# Patient Record
Sex: MALE
[De-identification: names, ages, dates, MRNs, and addresses within clinical notes are randomized per-mention and may not be internally consistent; named-entity substitution may affect disease eponyms.]

---

## 2017-06-29 PROBLEM — Z00.00 ENCOUNTER FOR PREVENTIVE HEALTH EXAMINATION: Status: ACTIVE | Noted: 2017-06-29

## 2017-07-06 ENCOUNTER — APPOINTMENT (OUTPATIENT)
Dept: CARDIOLOGY | Facility: CLINIC | Age: 42
End: 2017-07-06
Payer: COMMERCIAL

## 2017-07-06 PROCEDURE — 99244 OFF/OP CNSLTJ NEW/EST MOD 40: CPT

## 2017-07-14 ENCOUNTER — APPOINTMENT (OUTPATIENT)
Dept: CARDIOLOGY | Facility: CLINIC | Age: 42
End: 2017-07-14
Payer: COMMERCIAL

## 2017-07-14 PROCEDURE — 93306 TTE W/DOPPLER COMPLETE: CPT

## 2017-07-25 ENCOUNTER — APPOINTMENT (OUTPATIENT)
Dept: CARDIOLOGY | Facility: CLINIC | Age: 42
End: 2017-07-25
Payer: COMMERCIAL

## 2017-07-25 PROCEDURE — 93351 STRESS TTE COMPLETE: CPT

## 2017-08-01 ENCOUNTER — APPOINTMENT (OUTPATIENT)
Dept: CARDIOLOGY | Facility: CLINIC | Age: 42
End: 2017-08-01

## 2019-05-13 ENCOUNTER — APPOINTMENT (OUTPATIENT)
Dept: RADIOLOGY | Facility: CLINIC | Age: 44
End: 2019-05-13
Payer: COMMERCIAL

## 2019-05-13 PROCEDURE — 72100 X-RAY EXAM L-S SPINE 2/3 VWS: CPT

## 2019-05-19 ENCOUNTER — EMERGENCY (EMERGENCY)
Facility: HOSPITAL | Age: 44
LOS: 1 days | End: 2019-05-19
Admitting: EMERGENCY MEDICINE
Payer: COMMERCIAL

## 2019-05-19 PROCEDURE — 99282 EMERGENCY DEPT VISIT SF MDM: CPT

## 2019-10-04 ENCOUNTER — APPOINTMENT (OUTPATIENT)
Dept: RADIOLOGY | Facility: CLINIC | Age: 44
End: 2019-10-04
Payer: COMMERCIAL

## 2019-10-04 PROCEDURE — 72100 X-RAY EXAM L-S SPINE 2/3 VWS: CPT

## 2019-10-04 PROCEDURE — 72070 X-RAY EXAM THORAC SPINE 2VWS: CPT

## 2020-04-02 ENCOUNTER — EMERGENCY (EMERGENCY)
Facility: HOSPITAL | Age: 45
LOS: 1 days | End: 2020-04-02
Admitting: EMERGENCY MEDICINE
Payer: COMMERCIAL

## 2020-04-02 PROCEDURE — 71046 X-RAY EXAM CHEST 2 VIEWS: CPT | Mod: 26

## 2020-04-02 PROCEDURE — 99284 EMERGENCY DEPT VISIT MOD MDM: CPT

## 2020-04-02 PROCEDURE — 70450 CT HEAD/BRAIN W/O DYE: CPT | Mod: 26

## 2020-04-20 ENCOUNTER — APPOINTMENT (OUTPATIENT)
Dept: CARDIOLOGY | Facility: CLINIC | Age: 45
End: 2020-04-20
Payer: COMMERCIAL

## 2020-04-20 VITALS
OXYGEN SATURATION: 98 % | TEMPERATURE: 98.4 F | SYSTOLIC BLOOD PRESSURE: 176 MMHG | HEIGHT: 74 IN | HEART RATE: 67 BPM | DIASTOLIC BLOOD PRESSURE: 102 MMHG | BODY MASS INDEX: 39.78 KG/M2 | WEIGHT: 310 LBS

## 2020-04-20 DIAGNOSIS — Z82.49 FAMILY HISTORY OF ISCHEMIC HEART DISEASE AND OTHER DISEASES OF THE CIRCULATORY SYSTEM: ICD-10-CM

## 2020-04-20 DIAGNOSIS — Z78.9 OTHER SPECIFIED HEALTH STATUS: ICD-10-CM

## 2020-04-20 DIAGNOSIS — Z87.898 PERSONAL HISTORY OF OTHER SPECIFIED CONDITIONS: ICD-10-CM

## 2020-04-20 DIAGNOSIS — Z82.3 FAMILY HISTORY OF STROKE: ICD-10-CM

## 2020-04-20 DIAGNOSIS — F17.200 NICOTINE DEPENDENCE, UNSPECIFIED, UNCOMPLICATED: ICD-10-CM

## 2020-04-20 PROCEDURE — 99214 OFFICE O/P EST MOD 30 MIN: CPT

## 2020-04-20 RX ORDER — LABETALOL HYDROCHLORIDE 200 MG/1
200 TABLET, FILM COATED ORAL TWICE DAILY
Qty: 120 | Refills: 0 | Status: DISCONTINUED | COMMUNITY
End: 2020-04-20

## 2020-04-20 NOTE — PHYSICAL EXAM
[General Appearance - Well Developed] : well developed [Well Groomed] : well groomed [Normal Appearance] : normal appearance [General Appearance - In No Acute Distress] : no acute distress [General Appearance - Well Nourished] : well nourished [No Deformities] : no deformities [Normal Oral Mucosa] : normal oral mucosa [Eyelids - No Xanthelasma] : the eyelids demonstrated no xanthelasmas [Normal Conjunctiva] : the conjunctiva exhibited no abnormalities [No Oral Cyanosis] : no oral cyanosis [No Oral Pallor] : no oral pallor [Respiration, Rhythm And Depth] : normal respiratory rhythm and effort [Exaggerated Use Of Accessory Muscles For Inspiration] : no accessory muscle use [Heart Sounds] : normal S1 and S2 [Auscultation Breath Sounds / Voice Sounds] : lungs were clear to auscultation bilaterally [Heart Rate And Rhythm] : heart rate and rhythm were normal [Edema] : no peripheral edema present [Murmurs] : no murmurs present [Abdomen Soft] : soft [Gait - Sufficient For Exercise Testing] : the gait was sufficient for exercise testing [Abdomen Tenderness] : non-tender [Abnormal Walk] : normal gait [FreeTextEntry1] : Obese [Nail Clubbing] : no clubbing of the fingernails [Cyanosis, Localized] : no localized cyanosis [] : no rash [Skin Color & Pigmentation] : normal skin color and pigmentation [Skin Turgor] : normal skin turgor [Affect] : the affect was normal [Oriented To Time, Place, And Person] : oriented to person, place, and time [Mood] : the mood was normal [No Anxiety] : not feeling anxious [Memory Recent] : recent memory was not impaired

## 2020-04-20 NOTE — DISCUSSION/SUMMARY
[FreeTextEntry1] : Uncontrolled hypertension: Patient has been compliant to medications.  He is not compliant to lifestyle modifications.  It seems that metoprolol had worked better than labetalol when he was in the ER?.  Change labetalol to Coreg.  Continue lisinopril.  Add HCTZ. Nonpharmacologic ways of reducing blood pressure were discussed.  This includes cessation of alcohol. If the blood pressure remains uncontrolled, secondary hypertension work-up should be initiated.\par \par Follow-up echocardiogram in few days along with blood pressure check.  He has shortness of breath on exertion.  Evaluate for PASP and diastolic function and wall thickness.\par \par Smoking cessation was discussed.  Risks of continued smoking and benefits of cessation were discussed.  Available pharmacotherapy was discussed.\par \par Cholesteremia was noted on recent labs.  He does not remember the lipid profile.  Will request a copy of this.  Statin therapy may be indicated depending upon calculated 10-year cardiovascular risk.  If there is any Gray area, CT calcium score should be considered.\par \par Patient could not have stress test in 2017 due to hypertension.  Once her blood pressures under control, ETT on medication should be performed.  This would also give us information regarding blood pressure control as well as exercise capacity.\par \par Thank you for this referral and allowing me to participate in the care of this patient.  If can be of any further help or  if you have any questions, please do not hesitate to contact me\par \par \par Sincerely,\par \par Schuyler Adrian MD, FACC, REGINA\par \par

## 2020-04-20 NOTE — ASSESSMENT
[FreeTextEntry1] : Reviewed today:\par -Echocardiogram July 2017: Normal LV function and wall motion.Mild diastolic dysfunction.\par -Labs 4/2/2020: Normal BMP.  Negative cardiac troponin.\par -EKG sinus rhythm unremarkable.

## 2020-05-06 ENCOUNTER — APPOINTMENT (OUTPATIENT)
Dept: CARDIOLOGY | Facility: CLINIC | Age: 45
End: 2020-05-06

## 2020-05-06 ENCOUNTER — APPOINTMENT (OUTPATIENT)
Dept: CARDIOLOGY | Facility: CLINIC | Age: 45
End: 2020-05-06
Payer: COMMERCIAL

## 2020-05-06 VITALS
DIASTOLIC BLOOD PRESSURE: 100 MMHG | WEIGHT: 307 LBS | SYSTOLIC BLOOD PRESSURE: 160 MMHG | BODY MASS INDEX: 39.42 KG/M2 | HEART RATE: 82 BPM | OXYGEN SATURATION: 96 % | TEMPERATURE: 98.6 F

## 2020-05-06 PROCEDURE — 93306 TTE W/DOPPLER COMPLETE: CPT

## 2020-05-13 ENCOUNTER — APPOINTMENT (OUTPATIENT)
Dept: CARDIOLOGY | Facility: CLINIC | Age: 45
End: 2020-05-13
Payer: COMMERCIAL

## 2020-05-13 ENCOUNTER — APPOINTMENT (OUTPATIENT)
Dept: CARDIOLOGY | Facility: CLINIC | Age: 45
End: 2020-05-13

## 2020-05-13 VITALS
WEIGHT: 307 LBS | TEMPERATURE: 98.4 F | SYSTOLIC BLOOD PRESSURE: 148 MMHG | HEART RATE: 82 BPM | DIASTOLIC BLOOD PRESSURE: 90 MMHG | OXYGEN SATURATION: 97 % | BODY MASS INDEX: 39.4 KG/M2 | HEIGHT: 74 IN

## 2020-05-13 PROCEDURE — 99214 OFFICE O/P EST MOD 30 MIN: CPT

## 2020-05-13 NOTE — REASON FOR VISIT
[FreeTextEntry1] : Gato he is a 45-year-old male with history of chronic hypertension, chronic smoking, overweight and hypercholesterolemia.\par \par History of dyspnea on exertion for few years.  In 2017, he was evaluated by Dr. Victoria.  Echocardiogram had shown normal LV function and wall motion but stress test could not be done due to uncontrolled hypertension.  Patient did not follow-up for few years. \par \par He was seen in ER at [facility] [place] on 4/2/2020 for uncontrolled hypertension and periorbital pressure sensation.  He was given metoprolol in the ER with decrease in blood pressure and CAT scan of the head was negative.\par \par He admits to dyspnea on exertion for the past several weeks.  He has gained weight.  He continues to smoke.  He does not have any fever or cough.  He denies chest discomfort with exertion or dyspnea.\par \par Recently, he was told that he has hypercholesterolemia and that he should lose weight and reduce saturated fat intake.\par \par Started on Coreg 25mg BID, HCTZ 12.5mg QD and Lisinopril 10mg BID.  Presents today for BP reevaluation.  BP on my exam was 158/88mmHg.  Asymptomatic.  Echocardiogram doesn't show any significant cardiomyopathy, valvulopathy or aneurysm.

## 2020-05-13 NOTE — PHYSICAL EXAM
[General Appearance - Well Developed] : well developed [Normal Appearance] : normal appearance [Well Groomed] : well groomed [General Appearance - Well Nourished] : well nourished [General Appearance - In No Acute Distress] : no acute distress [Normal Conjunctiva] : the conjunctiva exhibited no abnormalities [No Deformities] : no deformities [Eyelids - No Xanthelasma] : the eyelids demonstrated no xanthelasmas [Normal Oral Mucosa] : normal oral mucosa [No Oral Pallor] : no oral pallor [No Oral Cyanosis] : no oral cyanosis [Respiration, Rhythm And Depth] : normal respiratory rhythm and effort [Exaggerated Use Of Accessory Muscles For Inspiration] : no accessory muscle use [Auscultation Breath Sounds / Voice Sounds] : lungs were clear to auscultation bilaterally [Heart Rate And Rhythm] : heart rate and rhythm were normal [Murmurs] : no murmurs present [Heart Sounds] : normal S1 and S2 [Abdomen Soft] : soft [Edema] : no peripheral edema present [Abdomen Tenderness] : non-tender [Abnormal Walk] : normal gait [Gait - Sufficient For Exercise Testing] : the gait was sufficient for exercise testing [Nail Clubbing] : no clubbing of the fingernails [Skin Color & Pigmentation] : normal skin color and pigmentation [Cyanosis, Localized] : no localized cyanosis [Skin Turgor] : normal skin turgor [Affect] : the affect was normal [Oriented To Time, Place, And Person] : oriented to person, place, and time [] : no rash [Mood] : the mood was normal [Memory Recent] : recent memory was not impaired [No Anxiety] : not feeling anxious [FreeTextEntry1] : Obese

## 2020-05-13 NOTE — DISCUSSION/SUMMARY
[FreeTextEntry1] : Uncontrolled hypertension: Patient has been compliant to medications.  He is not compliant to lifestyle modifications.  DC lisinopril and HCTZ.  Start Edarbichlor 40/25mg.  Check BMP in 1 week.  BP check thereafter.  Nonpharmacologic ways of reducing blood pressure were discussed.  This includes cessation of alcohol. If the blood pressure remains uncontrolled, secondary hypertension work-up should be initiated.\par \par Smoking cessation was discussed.  Risks of continued smoking and benefits of cessation were discussed.  Available pharmacotherapy was discussed.\par \par Cholesteremia was noted on recent labs.  He does not remember the lipid profile.  Will request a copy of this.  Statin therapy may be indicated depending upon calculated 10-year cardiovascular risk.  If there is any Gray area, CT calcium score should be considered.\par \par Patient could not have stress test in 2017 due to hypertension.  Once his blood pressures under control, ETT on medication should be performed.  This would also give us information regarding blood pressure control as well as exercise capacity.

## 2020-05-13 NOTE — ADDENDUM
[FreeTextEntry1] : Please note the patient was reviewed with the PA.\par I was physically present during the service of the patient\par I was directly involved in the management plan and recommendations of care provided to the patient. \par I personally reviewed the history and physical exam and plan as documented by the PA above.\par \par Clark Roque DO, FACC, RPVI\par Cardiologist\par 05/13/2020\par \par

## 2020-05-13 NOTE — ASSESSMENT
[FreeTextEntry1] : Reviewed previously:\par -Echocardiogram July 2017: Normal LV function and wall motion.Mild diastolic dysfunction.\par -Labs 4/2/2020: Normal BMP.  Negative cardiac troponin.\par -EKG sinus rhythm unremarkable.

## 2020-05-27 ENCOUNTER — APPOINTMENT (OUTPATIENT)
Dept: CARDIOLOGY | Facility: CLINIC | Age: 45
End: 2020-05-27

## 2020-07-27 ENCOUNTER — APPOINTMENT (OUTPATIENT)
Dept: CARDIOLOGY | Facility: CLINIC | Age: 45
End: 2020-07-27

## 2020-07-29 ENCOUNTER — APPOINTMENT (OUTPATIENT)
Dept: CARDIOLOGY | Facility: CLINIC | Age: 45
End: 2020-07-29
Payer: COMMERCIAL

## 2020-07-29 VITALS
HEIGHT: 74 IN | SYSTOLIC BLOOD PRESSURE: 158 MMHG | OXYGEN SATURATION: 99 % | DIASTOLIC BLOOD PRESSURE: 96 MMHG | HEART RATE: 67 BPM | BODY MASS INDEX: 37.86 KG/M2 | TEMPERATURE: 98 F | WEIGHT: 295 LBS

## 2020-07-29 PROCEDURE — 99214 OFFICE O/P EST MOD 30 MIN: CPT

## 2020-07-29 NOTE — DISCUSSION/SUMMARY
[FreeTextEntry1] : Uncontrolled hypertension: Patient has been compliant to medications.  He is not compliant to lifestyle modifications and understands untreated CAMACHO is likely playing a large role in this. Recommend increasing Lisinopril to 40mg daily. Continue Coreg 25mg BID and HCTZ 12.5mg daily. Will add Nifedipine ER 30mg daily. Nonpharmacologic ways of reducing blood pressure were discussed.  This includes cessation of alcohol and smoking, which patient understands. \par \par HLD: smoking and EtOH cessation advised. \par \par Patient could not have stress test in 2017 due to hypertension.  Once his blood pressures under control, ETT on medication should be performed.  This would also give us information regarding blood pressure control as well as exercise capacity.\par \par Patient states he is losing his insurance in about 1 week but he will get it back within the next 4 weeks. He states he is willing to pay out of pocket for office visit in 4 weeks if he doesn't have insurance. Discussed the importance of close follow up.

## 2020-07-29 NOTE — HISTORY OF PRESENT ILLNESS
[FreeTextEntry1] : 45-year-old male with history of chronic hypertension, chronic smoking, overweight and hypercholesterolemia.\par \par History of dyspnea on exertion for few years.  In 2017, he was evaluated by Dr. Victoria.  Echocardiogram had shown normal LV function and wall motion but stress test could not be done due to uncontrolled hypertension.  Patient did not follow-up for few years. \par \par He was seen in ER at [facility] [place] on 4/2/2020 for uncontrolled hypertension and periorbital pressure sensation.  He was given metoprolol in the ER with decrease in blood pressure and CAT scan of the head was negative.\par \par He admits to dyspnea on exertion for the past several weeks.  He has gained weight.  He continues to smoke.  He does not have any fever or cough.  He denies chest discomfort with exertion or dyspnea.\par \par Recently, he was told that he has hypercholesterolemia and that he should lose weight and reduce saturated fat intake.\par \par Started on Coreg 25mg BID, HCTZ 12.5mg QD and Lisinopril 10mg BID.  Presents today for BP reevaluation.  BP on my exam remains elevated.  Asymptomatic.  \par \par Patient understands that CAMACHO is an issue. Wasn't able to get tested yet because of COVID-19. He wakes up very hypertensive.

## 2020-07-29 NOTE — PHYSICAL EXAM
[General Appearance - Well Developed] : well developed [Normal Appearance] : normal appearance [Well Groomed] : well groomed [No Deformities] : no deformities [General Appearance - Well Nourished] : well nourished [General Appearance - In No Acute Distress] : no acute distress [Normal Conjunctiva] : the conjunctiva exhibited no abnormalities [Eyelids - No Xanthelasma] : the eyelids demonstrated no xanthelasmas [Normal Oral Mucosa] : normal oral mucosa [No Oral Cyanosis] : no oral cyanosis [No Oral Pallor] : no oral pallor [Respiration, Rhythm And Depth] : normal respiratory rhythm and effort [Exaggerated Use Of Accessory Muscles For Inspiration] : no accessory muscle use [Auscultation Breath Sounds / Voice Sounds] : lungs were clear to auscultation bilaterally [Heart Sounds] : normal S1 and S2 [Heart Rate And Rhythm] : heart rate and rhythm were normal [Edema] : no peripheral edema present [Murmurs] : no murmurs present [Gait - Sufficient For Exercise Testing] : the gait was sufficient for exercise testing [Abnormal Walk] : normal gait [Nail Clubbing] : no clubbing of the fingernails [Skin Turgor] : normal skin turgor [Skin Color & Pigmentation] : normal skin color and pigmentation [Cyanosis, Localized] : no localized cyanosis [Affect] : the affect was normal [] : no rash [Oriented To Time, Place, And Person] : oriented to person, place, and time [Mood] : the mood was normal [Memory Recent] : recent memory was not impaired [No Anxiety] : not feeling anxious [FreeTextEntry1] : No JVD.  No Carotid bruits

## 2020-09-01 ENCOUNTER — APPOINTMENT (OUTPATIENT)
Dept: CARDIOLOGY | Facility: CLINIC | Age: 45
End: 2020-09-01
Payer: SELF-PAY

## 2020-09-01 VITALS
SYSTOLIC BLOOD PRESSURE: 132 MMHG | BODY MASS INDEX: 37.22 KG/M2 | TEMPERATURE: 98.4 F | HEIGHT: 74 IN | DIASTOLIC BLOOD PRESSURE: 84 MMHG | HEART RATE: 68 BPM | WEIGHT: 290 LBS | OXYGEN SATURATION: 97 %

## 2020-09-01 PROCEDURE — 99213 OFFICE O/P EST LOW 20 MIN: CPT

## 2020-09-01 NOTE — DISCUSSION/SUMMARY
[FreeTextEntry1] : Hypertension: better controlled.  Patient has been compliant to medications.  He is not compliant to lifestyle modifications and understands untreated CAMACHO is likely playing a large role in this. Recommend continuing Lisinopril 40mg daily. Continue Coreg 25mg BID and HCTZ 12.5mg daily. Continue Nifedipine ER 30mg daily. Nonpharmacologic ways of reducing blood pressure were discussed.  This includes cessation of alcohol and smoking, which patient understands. \par \par HLD: smoking and EtOH cessation advised. \par \par Dyspnea: largely resolved. Recommend ETT to assess BP response to exercise while on medications. \par \par Follow up in 4 months.

## 2020-09-01 NOTE — PHYSICAL EXAM
[General Appearance - Well Developed] : well developed [Normal Appearance] : normal appearance [Well Groomed] : well groomed [General Appearance - Well Nourished] : well nourished [No Deformities] : no deformities [General Appearance - In No Acute Distress] : no acute distress [Normal Conjunctiva] : the conjunctiva exhibited no abnormalities [Eyelids - No Xanthelasma] : the eyelids demonstrated no xanthelasmas [Normal Oral Mucosa] : normal oral mucosa [No Oral Pallor] : no oral pallor [No Oral Cyanosis] : no oral cyanosis [Respiration, Rhythm And Depth] : normal respiratory rhythm and effort [Exaggerated Use Of Accessory Muscles For Inspiration] : no accessory muscle use [Auscultation Breath Sounds / Voice Sounds] : lungs were clear to auscultation bilaterally [Heart Rate And Rhythm] : heart rate and rhythm were normal [Heart Sounds] : normal S1 and S2 [Murmurs] : no murmurs present [Edema] : no peripheral edema present [Abnormal Walk] : normal gait [Gait - Sufficient For Exercise Testing] : the gait was sufficient for exercise testing [Nail Clubbing] : no clubbing of the fingernails [Cyanosis, Localized] : no localized cyanosis [Skin Color & Pigmentation] : normal skin color and pigmentation [Skin Turgor] : normal skin turgor [] : no rash [Oriented To Time, Place, And Person] : oriented to person, place, and time [Affect] : the affect was normal [Mood] : the mood was normal [Memory Recent] : recent memory was not impaired [No Anxiety] : not feeling anxious [FreeTextEntry1] : No JVD.  No Carotid bruits

## 2020-09-01 NOTE — HISTORY OF PRESENT ILLNESS
[FreeTextEntry1] : 45-year-old male with history of chronic hypertension, chronic smoking, overweight and hypercholesterolemia.\par \par History of dyspnea on exertion for few years.  In 2017, he was evaluated by Dr. Victoria.  Echocardiogram had shown normal LV function and wall motion but stress test could not be done due to uncontrolled hypertension.  Patient did not follow-up for few years. \par \par [place] [facility] for uncontrolled hypertension and periorbital pressure sensation.  He was given metoprolol in the ER with decrease in blood pressure and CAT scan of the head was negative.\par \par He admits to dyspnea on exertion for the past several weeks.  He has gained weight.  He continues to smoke.  He does not have any fever or cough.  He denies chest discomfort with exertion or dyspnea.\par \par Recently, he was told that he has hypercholesterolemia and that he should lose weight and reduce saturated fat intake.\par \par Started on Coreg 25mg BID, HCTZ 12.5mg QD and Lisinopril 10mg BID.  Presents today for BP reevaluation.  BP on my exam remains elevated.  Asymptomatic.  \par \par Patient understands that CAMACHO is an issue. Wasn't able to get tested yet because of COVID-19. He wakes up very hypertensive.

## 2021-01-27 ENCOUNTER — EMERGENCY (EMERGENCY)
Facility: HOSPITAL | Age: 46
LOS: 1 days | End: 2021-01-27
Admitting: EMERGENCY MEDICINE
Payer: COMMERCIAL

## 2021-01-27 ENCOUNTER — NON-APPOINTMENT (OUTPATIENT)
Age: 46
End: 2021-01-27

## 2021-01-27 PROCEDURE — 93010 ELECTROCARDIOGRAM REPORT: CPT

## 2021-01-27 PROCEDURE — 99285 EMERGENCY DEPT VISIT HI MDM: CPT

## 2021-01-27 PROCEDURE — 71045 X-RAY EXAM CHEST 1 VIEW: CPT | Mod: 26

## 2021-02-24 ENCOUNTER — APPOINTMENT (OUTPATIENT)
Dept: CARDIOLOGY | Facility: CLINIC | Age: 46
End: 2021-02-24

## 2021-09-28 ENCOUNTER — RX RENEWAL (OUTPATIENT)
Age: 46
End: 2021-09-28

## 2021-09-28 ENCOUNTER — APPOINTMENT (OUTPATIENT)
Dept: CARDIOLOGY | Facility: CLINIC | Age: 46
End: 2021-09-28
Payer: COMMERCIAL

## 2021-09-28 VITALS
HEIGHT: 74 IN | OXYGEN SATURATION: 95 % | SYSTOLIC BLOOD PRESSURE: 204 MMHG | RESPIRATION RATE: 16 BRPM | BODY MASS INDEX: 40.43 KG/M2 | WEIGHT: 315 LBS | DIASTOLIC BLOOD PRESSURE: 102 MMHG | HEART RATE: 88 BPM

## 2021-09-28 VITALS — SYSTOLIC BLOOD PRESSURE: 196 MMHG | DIASTOLIC BLOOD PRESSURE: 112 MMHG

## 2021-09-28 VITALS — TEMPERATURE: 97.7 F

## 2021-09-28 DIAGNOSIS — E66.9 OBESITY, UNSPECIFIED: ICD-10-CM

## 2021-09-28 PROCEDURE — 99214 OFFICE O/P EST MOD 30 MIN: CPT

## 2021-09-28 RX ORDER — NIFEDIPINE 30 MG/1
30 TABLET, EXTENDED RELEASE ORAL
Qty: 90 | Refills: 3 | Status: DISCONTINUED | COMMUNITY
Start: 2020-07-29 | End: 2021-09-28

## 2021-09-28 NOTE — HISTORY OF PRESENT ILLNESS
[FreeTextEntry1] : 46-year-old male with history of chronic hypertension, chronic smoking, overweight and hypercholesterolemia.\par \par History of dyspnea on exertion for few years.  In 2017, he was evaluated by Dr. Victoria.  Echocardiogram had shown normal LV function and wall motion but stress test could not be done due to uncontrolled hypertension.  Patient did not follow-up for few years. \par \par He was seen in ER at [facility] on 4/2/2020 for uncontrolled hypertension and periorbital pressure sensation.  He was given metoprolol in the ER with decrease in blood pressure and CAT scan of the head was negative.\par \par He has gained 35lbs since last seen.  He continues to smoke.\kiesha banda Presents for reevaluation and renewal of his medications.  He has not taken them for several days as he ran out.  Blood pressure on my evaluation was 170/98 mmHg.  Asymptomatic in the office today.\kiesha banda Was evaluated in the ED January 2021 for hypertension along with chest pressure.  There was no follow-up.  Patient admits to continued occasional chest pressure especially when blood pressure is elevated.  There is shortness of breath.  He is active at his job with no exertional symptoms.\par \par He has not gone evaluation for sleep apnea.

## 2021-09-28 NOTE — PHYSICAL EXAM
[General Appearance - Well Developed] : well developed [Normal Appearance] : normal appearance [Well Groomed] : well groomed [General Appearance - Well Nourished] : well nourished [No Deformities] : no deformities [General Appearance - In No Acute Distress] : no acute distress [Normal Conjunctiva] : the conjunctiva exhibited no abnormalities [Eyelids - No Xanthelasma] : the eyelids demonstrated no xanthelasmas [] : no respiratory distress [Respiration, Rhythm And Depth] : normal respiratory rhythm and effort [Exaggerated Use Of Accessory Muscles For Inspiration] : no accessory muscle use [Auscultation Breath Sounds / Voice Sounds] : lungs were clear to auscultation bilaterally [Heart Rate And Rhythm] : heart rate and rhythm were normal [Heart Sounds] : normal S1 and S2 [Murmurs] : no murmurs present [Edema] : no peripheral edema present [Abnormal Walk] : normal gait [Gait - Sufficient For Exercise Testing] : the gait was sufficient for exercise testing [Nail Clubbing] : no clubbing of the fingernails [Cyanosis, Localized] : no localized cyanosis [Skin Color & Pigmentation] : normal skin color and pigmentation [Skin Turgor] : normal skin turgor [Oriented To Time, Place, And Person] : oriented to person, place, and time [Affect] : the affect was normal [Mood] : the mood was normal [Memory Recent] : recent memory was not impaired [No Anxiety] : not feeling anxious [FreeTextEntry1] : Psoriatic patches on elbows.

## 2021-09-28 NOTE — DISCUSSION/SUMMARY
[FreeTextEntry1] : Hypertension: Uncontrolled off his medications.  Patient verbalizes understanding need to remain on medicines.  They have been renewed today.  Nonpharmacologic ways of reducing blood pressure were discussed.  This includes cessation of alcohol and smoking, which patient understands.  Chantix was prescribed.\par \par HLD: smoking and EtOH cessation advised.  Repeat labs will be needed in the near future.\par \par Dyspnea: Along with occasional chest pressure.  Associated more with times of hypertension.  Repeat echocardiogram.  History of enlarged aortic root.  Return for blood pressure check and review echo results.  If blood pressure well controlled, would recommend nuclear stress test on medications to evaluate chest discomfort.  Red flag symptoms that would warrant emergent evaluation discussed.  Pt verbalizes understanding.

## 2021-10-12 ENCOUNTER — APPOINTMENT (OUTPATIENT)
Dept: CARDIOLOGY | Facility: CLINIC | Age: 46
End: 2021-10-12
Payer: COMMERCIAL

## 2021-10-12 PROCEDURE — 93306 TTE W/DOPPLER COMPLETE: CPT

## 2021-10-14 ENCOUNTER — APPOINTMENT (OUTPATIENT)
Dept: CARDIOLOGY | Facility: CLINIC | Age: 46
End: 2021-10-14
Payer: COMMERCIAL

## 2021-10-14 VITALS
BODY MASS INDEX: 40.43 KG/M2 | WEIGHT: 315 LBS | SYSTOLIC BLOOD PRESSURE: 122 MMHG | HEART RATE: 71 BPM | HEIGHT: 74 IN | OXYGEN SATURATION: 94 % | DIASTOLIC BLOOD PRESSURE: 84 MMHG

## 2021-10-14 DIAGNOSIS — R06.83 SNORING: ICD-10-CM

## 2021-10-14 PROCEDURE — 93000 ELECTROCARDIOGRAM COMPLETE: CPT

## 2021-10-14 PROCEDURE — 99214 OFFICE O/P EST MOD 30 MIN: CPT | Mod: 25

## 2021-10-14 RX ORDER — VARENICLINE 0.5 MG/1
0.5 TABLET, FILM COATED ORAL
Qty: 655 | Refills: 0 | Status: DISCONTINUED | COMMUNITY
Start: 2021-09-28 | End: 2021-10-14

## 2021-10-14 NOTE — DISCUSSION/SUMMARY
[FreeTextEntry1] : Hypertension: Uncontrolled off his medications. Since resuming medications, BP appears well controlled.   Patient verbalizes understanding need to remain on medicines.  Nonpharmacologic ways of reducing blood pressure were discussed.  This includes cessation of alcohol and smoking, which patient understands.  \par \par HLD: smoking and EtOH cessation advised.  \par \par Dyspnea: Along with occasional chest pressure.  Associated more with times of hypertension. BP is now well controlled. Recommend pharmacologic nuclear stress testing on medications. \par \par Dilated Aortic Root: measured 3.9cm on echo from 10/12/2021. Yearly echo surveillance. Adequate BP control. \par \par Recommend home sleep study secondary to obesity, large neck circumference, uncontrolled HTN, and snoring.\par \par Follow up in 3 months.

## 2021-10-14 NOTE — HISTORY OF PRESENT ILLNESS
[FreeTextEntry1] : 46-year-old male with history of chronic hypertension, chronic smoking, overweight and hypercholesterolemia.\par \par History of dyspnea on exertion for few years.  In 2017, he was evaluated by Dr. Victoria.  Echocardiogram had shown normal LV function and wall motion but stress test could not be done due to uncontrolled hypertension.  Patient did not follow-up for few years. \par \par He was seen in ER at [facility] [place] [facility] on 4/2/2020 for uncontrolled hypertension and periorbital pressure sensation.  He was given metoprolol in the ER with decrease in blood pressure and CAT scan of the head was negative.\par \par He has gained 35lbs since last seen.  He continues to smoke.\kiesha banda Presents for reevaluation and renewal of his medications.  He has not taken them for several days as he ran out.  Blood pressure on my evaluation was 170/98 mmHg.  Asymptomatic in the office today.\kiesha banda Was evaluated in the ED January 2021 for hypertension along with chest pressure.  There was no follow-up.  Patient admits to continued occasional chest pressure especially when blood pressure is elevated.  There is shortness of breath.  He is active at his job with no exertional symptoms.\par \par He has not gone evaluation for sleep apnea.

## 2021-10-14 NOTE — CARDIOLOGY SUMMARY
[Normal] : normal [___] : [unfilled] [LVEF ___%] : LVEF [unfilled]% [de-identified] : 10/12/2021, LV EF 60%, mild MR moderate LVH, mild TR [de-identified] : 10/14/2021, NSR with Q waves inferiorly

## 2021-10-14 NOTE — REASON FOR VISIT
[Symptom and Test Evaluation] : symptom and test evaluation [Hypertension] : hypertension [Other: ____] : [unfilled] [FreeTextEntry3] : Brendon Thorpe

## 2021-10-17 ENCOUNTER — APPOINTMENT (OUTPATIENT)
Age: 46
End: 2021-10-17

## 2021-10-23 ENCOUNTER — APPOINTMENT (OUTPATIENT)
Age: 46
End: 2021-10-23

## 2021-10-31 ENCOUNTER — APPOINTMENT (OUTPATIENT)
Age: 46
End: 2021-10-31

## 2021-11-15 ENCOUNTER — APPOINTMENT (OUTPATIENT)
Dept: CARDIOLOGY | Facility: CLINIC | Age: 46
End: 2021-11-15
Payer: COMMERCIAL

## 2021-11-15 PROCEDURE — 78452 HT MUSCLE IMAGE SPECT MULT: CPT

## 2021-11-15 PROCEDURE — 93015 CV STRESS TEST SUPVJ I&R: CPT

## 2021-11-15 PROCEDURE — A9502: CPT

## 2021-11-17 ENCOUNTER — APPOINTMENT (OUTPATIENT)
Dept: CARDIOLOGY | Facility: CLINIC | Age: 46
End: 2021-11-17
Payer: COMMERCIAL

## 2021-11-17 VITALS
HEIGHT: 74 IN | HEART RATE: 72 BPM | WEIGHT: 315 LBS | DIASTOLIC BLOOD PRESSURE: 80 MMHG | OXYGEN SATURATION: 97 % | BODY MASS INDEX: 40.43 KG/M2 | SYSTOLIC BLOOD PRESSURE: 132 MMHG

## 2021-11-17 DIAGNOSIS — I71.9 AORTIC ANEURYSM OF UNSPECIFIED SITE, W/OUT RUPTURE: ICD-10-CM

## 2021-11-17 DIAGNOSIS — I10 ESSENTIAL (PRIMARY) HYPERTENSION: ICD-10-CM

## 2021-11-17 DIAGNOSIS — R94.39 ABNORMAL RESULT OF OTHER CARDIOVASCULAR FUNCTION STUDY: ICD-10-CM

## 2021-11-17 DIAGNOSIS — Z79.899 OTHER LONG TERM (CURRENT) DRUG THERAPY: ICD-10-CM

## 2021-11-17 DIAGNOSIS — E78.00 PURE HYPERCHOLESTEROLEMIA, UNSPECIFIED: ICD-10-CM

## 2021-11-17 PROCEDURE — 99215 OFFICE O/P EST HI 40 MIN: CPT

## 2021-11-17 NOTE — DISCUSSION/SUMMARY
[FreeTextEntry1] : 1. Hypertension: Uncontrolled off his medications. Since resuming medications, BP appears well controlled.   Patient verbalizes understanding need to remain on medicines.  Nonpharmacologic ways of reducing blood pressure were discussed.  This includes cessation of alcohol and smoking, which patient understands.  Patient should continue Coreg 25mg BID (high risk medication with no signs of toxicity), HCTZ 12.5mg daily, lisinopril 40mg daily, and Nifedipine ER 30mg daily. Goal BP less than 130/80.\par \par 2. HLD: smoking and EtOH cessation advised.  \par \par 3. Dyspnea: Along with occasional chest pressure.  Associated more with times of hypertension. BP is now well controlled. Recommended pharmacologic nuclear stress testing on medications. Testing resulted in possible ischemia. Discussed invasive angiography vs. CTA of the coronary arteries. Risks and benefits of each one. Patient opting for CTA of the coronary arteries. \par \par 4. Dilated Aortic Root: measured 3.9cm on echo from 10/12/2021. Yearly echo surveillance. Adequate BP control. \par \par 5. Recommend home sleep study secondary to obesity, large neck circumference, uncontrolled HTN, and snoring.\par \par Follow up in 3 months.

## 2021-11-17 NOTE — PHYSICAL EXAM
[Normal] : moves all extremities, no focal deficits, normal speech [de-identified] : No carotid bruits auscultated bilaterally

## 2021-11-17 NOTE — CARDIOLOGY SUMMARY
[de-identified] : 10/14/2021, NSR with Q waves inferiorly [de-identified] : 11/15/2021, Pharmacologic Nuclear Stress Testing: small area of reversibility in the basal anterior, anterolateral wall suggestive of ischemia. [de-identified] : 10/12/2021, LV EF 60%, mild MR moderate LVH, mild TR

## 2021-11-17 NOTE — HISTORY OF PRESENT ILLNESS
[FreeTextEntry1] : 46-year-old male with history of chronic hypertension, chronic smoking, overweight and hypercholesterolemia.\par \par History of dyspnea on exertion for few years.  In 2017, he was evaluated by Dr. Victoria.  Echocardiogram had shown normal LV function and wall motion but stress test could not be done due to uncontrolled hypertension.  Patient did not follow-up for few years. \par \par He was seen in ER at [facility] [place] on 4/2/2020 for uncontrolled hypertension and periorbital pressure sensation.  He was given metoprolol in the ER with decrease in blood pressure and CAT scan of the head was negative.\par \par He has gained 35lbs since last seen.  He continues to smoke.\kiesha banda Presents for reevaluation and renewal of his medications.  He has not taken them for several days as he ran out.  Blood pressure on my evaluation was 170/98 mmHg.  Asymptomatic in the office today.\kiesha banda Was evaluated in the ED January 2021 for hypertension along with chest pressure.  There was no follow-up.  Patient admits to continued occasional chest pressure especially when blood pressure is elevated.  There is shortness of breath.  He is active at his job with no exertional symptoms.\par \par He has not gone evaluation for sleep apnea.

## 2021-12-12 ENCOUNTER — APPOINTMENT (OUTPATIENT)
Age: 46
End: 2021-12-12

## 2021-12-13 ENCOUNTER — NON-APPOINTMENT (OUTPATIENT)
Age: 46
End: 2021-12-13

## 2021-12-19 ENCOUNTER — APPOINTMENT (OUTPATIENT)
Age: 46
End: 2021-12-19

## 2021-12-20 ENCOUNTER — RESULT REVIEW (OUTPATIENT)
Age: 46
End: 2021-12-20

## 2021-12-20 ENCOUNTER — OUTPATIENT (OUTPATIENT)
Dept: OUTPATIENT SERVICES | Facility: HOSPITAL | Age: 46
LOS: 1 days | End: 2021-12-20
Payer: COMMERCIAL

## 2021-12-20 ENCOUNTER — APPOINTMENT (OUTPATIENT)
Dept: CT IMAGING | Facility: CLINIC | Age: 46
End: 2021-12-20
Payer: COMMERCIAL

## 2021-12-20 DIAGNOSIS — R94.39 ABNORMAL RESULT OF OTHER CARDIOVASCULAR FUNCTION STUDY: ICD-10-CM

## 2021-12-20 PROCEDURE — 82565 ASSAY OF CREATININE: CPT

## 2021-12-20 PROCEDURE — 75574 CT ANGIO HRT W/3D IMAGE: CPT | Mod: 26

## 2021-12-20 PROCEDURE — 75574 CT ANGIO HRT W/3D IMAGE: CPT

## 2021-12-21 ENCOUNTER — NON-APPOINTMENT (OUTPATIENT)
Age: 46
End: 2021-12-21

## 2021-12-30 DIAGNOSIS — R06.02 SHORTNESS OF BREATH: ICD-10-CM

## 2021-12-30 DIAGNOSIS — Z87.891 PERSONAL HISTORY OF NICOTINE DEPENDENCE: ICD-10-CM

## 2021-12-30 RX ORDER — VARENICLINE 0.5 MG/1
0.5 TABLET, FILM COATED ORAL
Qty: 319 | Refills: 0 | Status: ACTIVE | COMMUNITY
Start: 2021-12-30 | End: 1900-01-01

## 2022-03-21 NOTE — REASON FOR VISIT
[FreeTextEntry1] : Floyd is a 9 year old male here for follow up evaluation of inattention. \par \par Floyd is currently in 2nd grade.  He is in an ICT class but on the general education side.  While he is doing well academically there are concerns from teachers that he requires redirection.  He has a tendency to talk during class. When it comes to homework, he is able to do most of it independently but does need mother to sit next tp him to keep him on point.  He is easily distracted and often stops to talk.  Mother notes he struggles the most with reading comprehension and math word problems.  \par \par [place] questionnaires were completed after last visit by parents and teacher. \par \par  Parents responses:\par  Inattention 6/9- (6/9).  \par  Hyperactivity 3/9 (6/9)\par  ODD: 0/8. (4/8)\par  Conduct disorder: 0/14 (3/14)\par  Anxiety/ Depression: 0/7 (3/7)  \par \par Teachers responses: \par  Inattention 9/9- (6/9).  \par  Hyperactivity 3/9 (6/9)\par  ODD/ Conduct: 0/10. (3/10)\par  Anxiety/ Depression: 0/7 (3/7 )  \par \par Performance questions:\par Parents: Areas of concern include  overall school performance\par Teachers: Areas of concern include mathematics and written expression. Following directions,disrupting class,  assignment completion and organizational skills.\par \par  \par \par \par Initial Visit: \par Early development:  Floyd was born full term via NVD. He was discharged home with mother. He hit all early developmental milestones appropriately except he did not speak until 4 years old.  Mother denies academic, behavioral or social concerns in early childhood. \par \par Educational assessment: \par Current Grade: 1st grade \par Current District: [employer] \par Floyd started  in a General education class. Mother recalls there was a concern for lack of focus and he had a tendency to get out of his seat often.  Academically there were no concerns from teacher. He transitioned to remote learning in March of 2020 due to [place]. Mother agrees there were attention issues and he required a lof of redirection.  He remains fully remote but mother notes there has been significant improvement in ability to complete assignments independently as well as the ability to sit in focus overall. He will be returning to in person next school year.    Despite remote learning mother notes he is on target academically. \par \par \par Home assessment: \par At home Floyd does requires redirection- even for daily routines.  He is able to complete multi- step commands- if he wants to.  He is able to sit and eat within a reasonable amount of time. He does have some difficulty transitioning but will eventually do it.  \par \par Social concerns: Has not socialize outside of family friends since Co-vid- but mother denies concerns last year. She does not that he  does not loose well\par \par Co- morbidities: No concern for anxiety, depression, OCD\par \par Sleep: 10pm- 7am \par \par Other health concerns: Denies staring, twitching, seizure or seizure-like activity. No serious head injury, meningoencephalitis.\par  No [FreeTextEntry1] : Gato he is a 45-year-old male with history of chronic hypertension, chronic smoking, overweight and hypercholesterolemia.\par \par History of dyspnea on exertion for few years.  In 2017, he was evaluated by Dr. Victoria.  Echocardiogram had shown normal LV function and wall motion but stress test could not be done due to uncontrolled hypertension.  Patient did not follow-up for few years. \par \par [place] [facility] [place] for uncontrolled hypertension and periorbital pressure sensation.  He was given metoprolol in the ER with decrease in blood pressure and CAT scan of the head was negative.\par \par He admits to dyspnea on exertion for the past several weeks.  He has gained weight.  He continues to smoke.  He does not have any fever or cough.  He denies chest discomfort with exertion or dyspnea.\par \par Recently, he was told that he has hypercholesterolemia and that he should lose weight and reduce saturated fat intake.

## 2023-05-22 RX ORDER — CARVEDILOL 25 MG/1
25 TABLET, FILM COATED ORAL
Qty: 60 | Refills: 0 | Status: ACTIVE | COMMUNITY
Start: 2020-04-20 | End: 1900-01-01

## 2023-05-22 RX ORDER — HYDROCHLOROTHIAZIDE 12.5 MG/1
12.5 TABLET ORAL
Qty: 30 | Refills: 0 | Status: ACTIVE | COMMUNITY
Start: 2020-04-20 | End: 1900-01-01

## 2023-05-22 RX ORDER — NIFEDIPINE 30 MG/1
30 TABLET, FILM COATED, EXTENDED RELEASE ORAL
Qty: 30 | Refills: 0 | Status: ACTIVE | COMMUNITY
Start: 2021-09-28 | End: 1900-01-01

## 2023-05-22 RX ORDER — LISINOPRIL 40 MG/1
40 TABLET ORAL DAILY
Qty: 30 | Refills: 0 | Status: ACTIVE | COMMUNITY
Start: 1900-01-01 | End: 1900-01-01

## 2024-04-25 ENCOUNTER — OFFICE (OUTPATIENT)
Dept: URBAN - METROPOLITAN AREA CLINIC 103 | Facility: CLINIC | Age: 49
Setting detail: OPHTHALMOLOGY
End: 2024-04-25
Payer: COMMERCIAL

## 2024-04-25 DIAGNOSIS — H16.123: ICD-10-CM

## 2024-04-25 DIAGNOSIS — Q10.3: ICD-10-CM

## 2024-04-25 PROCEDURE — 67820 REVISE EYELASHES: CPT | Mod: LT | Performed by: OPHTHALMOLOGY

## 2024-04-25 PROCEDURE — 92012 INTRM OPH EXAM EST PATIENT: CPT | Mod: 25 | Performed by: OPHTHALMOLOGY

## 2024-05-28 ENCOUNTER — NON-APPOINTMENT (OUTPATIENT)
Age: 49
End: 2024-05-28

## 2024-05-28 ENCOUNTER — APPOINTMENT (OUTPATIENT)
Dept: OPHTHALMOLOGY | Facility: CLINIC | Age: 49
End: 2024-05-28
Payer: SELF-PAY

## 2024-05-28 PROCEDURE — 92002 INTRM OPH EXAM NEW PATIENT: CPT

## 2024-05-28 PROCEDURE — 99204 OFFICE O/P NEW MOD 45 MIN: CPT

## 2024-05-31 ENCOUNTER — NON-APPOINTMENT (OUTPATIENT)
Age: 49
End: 2024-05-31

## 2024-05-31 ENCOUNTER — APPOINTMENT (OUTPATIENT)
Dept: OPHTHALMOLOGY | Facility: CLINIC | Age: 49
End: 2024-05-31
Payer: SELF-PAY

## 2024-05-31 PROCEDURE — 92012 INTRM OPH EXAM EST PATIENT: CPT

## 2024-06-10 ENCOUNTER — APPOINTMENT (OUTPATIENT)
Dept: OPHTHALMOLOGY | Facility: CLINIC | Age: 49
End: 2024-06-10

## 2024-07-29 ENCOUNTER — APPOINTMENT (OUTPATIENT)
Dept: OPHTHALMOLOGY | Facility: CLINIC | Age: 49
End: 2024-07-29
Payer: SELF-PAY

## 2024-07-29 ENCOUNTER — NON-APPOINTMENT (OUTPATIENT)
Age: 49
End: 2024-07-29

## 2024-07-29 PROCEDURE — 92012 INTRM OPH EXAM EST PATIENT: CPT
